# Patient Record
(demographics unavailable — no encounter records)

---

## 2024-11-11 NOTE — ASSESSMENT
[FreeTextEntry1] : Mr. 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   #Chronic systolic HFmrEF (EF 50%, ICM, class II) #CAD s/p LAD PCI 2002 #HTN #HLD, LDL goal <70 #NIDDM  -Order TTE to be performed prior to next visit -Continue metoprolol succinate 50 daily and entresto 49-51 BID for CHF. BP is well controlled. -Not on jardiance due to side effects -Continue ASA 81 daily -Advised that he could stop plavix, since it has been many years since PCI. He has had no bleeding issues and wishes to continue at this time -Continue atorvastatin 40 daily and zetia 10 daily. His last LDL was at goal. He has leg cramping with statins, but is able to tolerate current dose. -Continue lifestyle modifications for his NIDDM,  -Order CMP, CBC, lipids, A1c to be done prior to next visit -Encouraged improved lifestyle modifications with these recommendations below: -Plant-based and Mediterranean diets, along with increased fruit, nut, vegetable, legume, and lean vegetable or animal protein (preferably fish) consumption -Engage in at least 150 minutes per week of accumulated moderate-intensity aerobic physical activity or 75 minutes per week of vigorous-intensity aerobic physical activity  Time in encounter, including face to face visit and time reviewing chart:  33 minutes  Mike Lara MD, FACC Non-Invasive Cardiology [address] Office: [phone]

## 2024-11-11 NOTE — REVIEW OF SYSTEMS
[Fever] : no fever [Chills] : no chills [Blurry Vision] : no blurred vision [Earache] : no earache [Sore Throat] : no sore throat [SOB] : no shortness of breath [Dyspnea on exertion] : not dyspnea during exertion [Chest Discomfort] : no chest discomfort [Lower Ext Edema] : no extremity edema [Leg Claudication] : no intermittent leg claudication [Palpitations] : no palpitations [Orthopnea] : no orthopnea [PND] : no PND [Syncope] : no syncope [Cough] : no cough [Abdominal Pain] : no abdominal pain [Change in Appetite] : no change in appetite [Urinary Frequency] : no change in urinary frequency [Joint Pain] : no joint pain [Rash] : no rash [Dizziness] : no dizziness

## 2024-11-11 NOTE — REASON FOR VISIT
[Cardiac Failure] : cardiac failure [Coronary Artery Disease] : coronary artery disease [Spouse] : spouse [FreeTextEntry1] : Mr. JOSH JOVEL is a 77year old man with PMHx of chronic systolic HFmrEF (50%, ICM), CAD s/p LAD PCI 2002, HTN, NIDDM and HLD who is presenting for follow up. Clinically, he is doing well. He has no chest pain, SOB or palpitations. He does not exercise, but he is active and has no limitations.  He and his wife have many trips coming up- [place], [place], and [place].  TTE 4/5/24 CONCLUSIONS: 1. Left ventricular cavity is normal in size. Left ventricular wall thickness is normal. Left ventricular systolic function is low-normal with an ejection fraction of 50 % by Bradshaw's method of disks. Regional wall motion abnormalities present. 2. Apical septal segment, apical anterior segment, and apex are hypokinetic. 3. Normal left ventricular diastolic function, with normal filling pressure. 4. Normal right ventricular cavity size, with normal wall thickness, and normal systolic function. 5. No significant valvular disease. 6. No echocardiographic evidence of pulmonary hypertension. 7. Compared to the transthoracic echocardiogram performed on 3/2/2023, there have been no significant interval changes.  Carotid US 4/2/24 CONCLUSIONS: 1. Right: proximal ICA <50% stenosis. 2. Left: proximal ICA <50% stenosis. 3. Vertebral arteries: antegrade flow bilaterally. 4. Subclavian arteries: no significant atherosclerosis bilaterally.

## 2024-11-11 NOTE — ASSESSMENT
[FreeTextEntry1] : Mr. 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   #Chronic systolic HFmrEF (EF 50%, ICM, class II) #CAD s/p LAD PCI 2002 #HTN #HLD, LDL goal <70 #NIDDM  -Order TTE to be performed prior to next visit -Continue metoprolol succinate 50 daily and entresto 49-51 BID for CHF. BP is well controlled. -Not on jardiance due to side effects -Continue ASA 81 daily -Advised that he could stop plavix, since it has been many years since PCI. He has had no bleeding issues and wishes to continue at this time -Continue atorvastatin 40 daily and zetia 10 daily. His last LDL was at goal. He has leg cramping with statins, but is able to tolerate current dose. -Continue lifestyle modifications for his NIDDM,  -Order CMP, CBC, lipids, A1c to be done prior to next visit -Encouraged improved lifestyle modifications with these recommendations below: -Plant-based and Mediterranean diets, along with increased fruit, nut, vegetable, legume, and lean vegetable or animal protein (preferably fish) consumption -Engage in at least 150 minutes per week of accumulated moderate-intensity aerobic physical activity or 75 minutes per week of vigorous-intensity aerobic physical activity  Time in encounter, including face to face visit and time reviewing chart:  33 minutes  Mike Lara MD, FACC Non-Invasive Cardiology [address] Office: [phone]

## 2024-11-11 NOTE — REASON FOR VISIT
[Cardiac Failure] : cardiac failure [Coronary Artery Disease] : coronary artery disease [Spouse] : spouse [FreeTextEntry1] : Mr. JOSH JOVEL is a 77year old man with PMHx of chronic systolic HFmrEF (50%, ICM), CAD s/p LAD PCI 2002, HTN, NIDDM and HLD who is presenting for follow up. Clinically, he is doing well. He has no chest pain, SOB or palpitations. He does not exercise, but he is active and has no limitations.  He and his wife have many trips coming up- [place], [place], and [place].  TTE 4/5/24 CONCLUSIONS: 1. Left ventricular cavity is normal in size. Left ventricular wall thickness is normal. Left ventricular systolic function is low-normal with an ejection fraction of 50 % by Bradshaw's method of disks. Regional wall motion abnormalities present. 2. Apical septal segment, apical anterior segment, and apex are hypokinetic. 3. Normal left ventricular diastolic function, with normal filling pressure. 4. Normal right ventricular cavity size, with normal wall thickness, and normal systolic function. 5. No significant valvular disease. 6. No echocardiographic evidence of pulmonary hypertension. 7. Compared to the transthoracic echocardiogram performed on 3/2/2023, there have been no significant interval changes.  Carotid US 4/2/24 CONCLUSIONS: 1. Right: proximal ICA <50% stenosis. 2. Left: proximal ICA <50% stenosis. 3. Vertebral arteries: antegrade flow bilaterally. 4. Subclavian arteries: no significant atherosclerosis bilaterally.

## 2025-05-01 NOTE — ASSESSMENT
[FreeTextEntry1] : Mr. JOSH JOVEL is a 78year old man with PMHx of chronic systolic HFmrEF (50%, ICM), CAD s/p LAD PCI 2002, HTN, NIDDM and HLD  who is presenting for follow up.   #Chronic systolic HFmrEF (EF 50%, ICM, class II) #CAD s/p LAD PCI 2002 #HTN #HLD, LDL goal <70 #NIDDM  -Continue metoprolol succinate 50 daily and entresto 49-51 BID for CHF. BP is well controlled. -Not on jardiance due to side effects -Continue ASA 81 daily -Advised that he could stop plavix, since it has been many years since PCI. He has had no bleeding issues and wishes to continue at this time -Continue atorvastatin 40 daily and zetia 10 daily. His last LDL was at goal. He has leg cramping with statins, but is able to tolerate current dose. -Continue lifestyle modifications for his NIDDM,  -Order CMP, CBC, lipids, A1c to be done prior to next visit  -Encouraged improved lifestyle modifications with these recommendations below: -Plant-based and Mediterranean diets, along with increased fruit, nut, vegetable, legume, and lean vegetable or animal protein (preferably fish) consumption -Engage in at least 150 minutes per week of accumulated moderate-intensity aerobic physical activity or 75 minutes per week of vigorous-intensity aerobic physical activity  Time in encounter, including face to face visit and time reviewing chart:  33 minutes  Mike Lara MD, FACC Non-Invasive Cardiology [address], [address] Office: [phone]

## 2025-05-01 NOTE — REASON FOR VISIT
[Cardiac Failure] : cardiac failure [Coronary Artery Disease] : coronary artery disease [Spouse] : spouse [FreeTextEntry1] : Mr. JOSH JOVEL is a 78year old man with PMHx of chronic systolic HFmrEF (50%, ICM), CAD s/p LAD PCI 2002, HTN, NIDDM and HLD who is presenting for follow up. Clinically, he is doing well. He has no chest pain, SOB or palpitations. He does not exercise, but he is active and has no limitations.  He and his wife have many trips coming up. Next is [place].  TTE 4/28/2025 CONCLUSIONS: 1. Left ventricular cavity is normal in size. Left ventricular wall thickness is normal. Left ventricular systolic function is low-normal with an ejection fraction visually estimated at 50 %. 2. Normal left ventricular diastolic function, with normal left ventricular filling pressure. 3. Normal right ventricular cavity size and normal right ventricular systolic function. 4. Normal left and right atrial size. 5. No significant valvular disease. 6. Pulmonary artery systolic pressure could not be estimated. 7. No pericardial effusion seen. 8. Compared to the transthoracic echocardiogram performed on 4/5/2024, there have been no significant interval changes.  TTE 4/5/24 CONCLUSIONS: 1. Left ventricular cavity is normal in size. Left ventricular wall thickness is normal. Left ventricular systolic function is low-normal with an ejection fraction of 50 % by Bradshaw's method of disks. Regional wall motion abnormalities present. 2. Apical septal segment, apical anterior segment, and apex are hypokinetic. 3. Normal left ventricular diastolic function, with normal filling pressure. 4. Normal right ventricular cavity size, with normal wall thickness, and normal systolic function. 5. No significant valvular disease. 6. No echocardiographic evidence of pulmonary hypertension. 7. Compared to the transthoracic echocardiogram performed on 3/2/2023, there have been no significant interval changes.  Carotid US 4/2/24 CONCLUSIONS: 1. Right: proximal ICA <50% stenosis. 2. Left: proximal ICA <50% stenosis. 3. Vertebral arteries: antegrade flow bilaterally. 4. Subclavian arteries: no significant atherosclerosis bilaterally.